# Patient Record
Sex: MALE | Race: WHITE | Employment: OTHER | URBAN - METROPOLITAN AREA
[De-identification: names, ages, dates, MRNs, and addresses within clinical notes are randomized per-mention and may not be internally consistent; named-entity substitution may affect disease eponyms.]

---

## 2022-08-04 ENCOUNTER — HOSPITAL ENCOUNTER (EMERGENCY)
Age: 62
Discharge: LEFT BEFORE DISCHARGE | End: 2022-08-04
Attending: EMERGENCY MEDICINE
Payer: MEDICARE

## 2022-08-04 ENCOUNTER — APPOINTMENT (OUTPATIENT)
Dept: GENERAL RADIOLOGY | Age: 62
End: 2022-08-04
Payer: MEDICARE

## 2022-08-04 VITALS
BODY MASS INDEX: 31.39 KG/M2 | HEART RATE: 93 BPM | DIASTOLIC BLOOD PRESSURE: 58 MMHG | RESPIRATION RATE: 17 BRPM | HEIGHT: 67 IN | SYSTOLIC BLOOD PRESSURE: 128 MMHG | OXYGEN SATURATION: 100 % | TEMPERATURE: 97.5 F | WEIGHT: 200 LBS

## 2022-08-04 DIAGNOSIS — R55 NEAR SYNCOPE: Primary | ICD-10-CM

## 2022-08-04 DIAGNOSIS — D64.9 ANEMIA, UNSPECIFIED TYPE: ICD-10-CM

## 2022-08-04 DIAGNOSIS — J18.1 MULTIFOCAL LUNG CONSOLIDATION (HCC): ICD-10-CM

## 2022-08-04 LAB
ANION GAP SERPL CALCULATED.3IONS-SCNC: 17 MEQ/L (ref 9–15)
BASOPHILS ABSOLUTE: 0.1 K/UL (ref 0–0.1)
BASOPHILS RELATIVE PERCENT: 0.4 % (ref 0.2–1.2)
BUN BLDV-MCNC: 26 MG/DL (ref 8–23)
CALCIUM SERPL-MCNC: 9 MG/DL (ref 8.5–9.9)
CHLORIDE BLD-SCNC: 101 MEQ/L (ref 95–107)
CO2: 20 MEQ/L (ref 20–31)
CREAT SERPL-MCNC: 1.25 MG/DL (ref 0.7–1.2)
EKG ATRIAL RATE: 80 BPM
EKG Q-T INTERVAL: 406 MS
EKG QRS DURATION: 94 MS
EKG QTC CALCULATION (BAZETT): 471 MS
EKG R AXIS: 52 DEGREES
EKG T AXIS: -2 DEGREES
EKG VENTRICULAR RATE: 81 BPM
EOSINOPHILS ABSOLUTE: 0.1 K/UL (ref 0–0.5)
EOSINOPHILS RELATIVE PERCENT: 0.9 % (ref 0.8–7)
GFR AFRICAN AMERICAN: >60
GFR NON-AFRICAN AMERICAN: 58.2
GLUCOSE BLD-MCNC: 136 MG/DL (ref 70–99)
HCT VFR BLD CALC: 26.4 % (ref 42–52)
HEMOGLOBIN: 7.7 G/DL (ref 13.7–17.5)
IMMATURE GRANULOCYTES #: 0.1 K/UL
IMMATURE GRANULOCYTES %: 0.6 %
LYMPHOCYTES ABSOLUTE: 2.5 K/UL (ref 1.3–3.6)
LYMPHOCYTES RELATIVE PERCENT: 19.5 %
MCH RBC QN AUTO: 22.4 PG (ref 25.7–32.2)
MCHC RBC AUTO-ENTMCNC: 29.2 % (ref 32.3–36.5)
MCV RBC AUTO: 76.7 FL (ref 79–92.2)
MONOCYTES ABSOLUTE: 0.8 K/UL (ref 0.3–0.8)
MONOCYTES RELATIVE PERCENT: 6.4 % (ref 5.3–12.2)
NEUTROPHILS ABSOLUTE: 9.2 K/UL (ref 1.8–5.4)
NEUTROPHILS RELATIVE PERCENT: 72.2 % (ref 34–67.9)
PDW BLD-RTO: 17.1 % (ref 11.6–14.4)
PLATELET # BLD: 594 K/UL (ref 163–337)
POTASSIUM SERPL-SCNC: 4.3 MEQ/L (ref 3.4–4.9)
RBC # BLD: 3.44 M/UL (ref 4.63–6.08)
SARS-COV-2, NAAT: NOT DETECTED
SODIUM BLD-SCNC: 138 MEQ/L (ref 135–144)
TROPONIN: <0.01 NG/ML (ref 0–0.01)
WBC # BLD: 12.7 K/UL (ref 4.2–9)

## 2022-08-04 PROCEDURE — 85025 COMPLETE CBC W/AUTO DIFF WBC: CPT

## 2022-08-04 PROCEDURE — 93010 ELECTROCARDIOGRAM REPORT: CPT | Performed by: INTERNAL MEDICINE

## 2022-08-04 PROCEDURE — 84484 ASSAY OF TROPONIN QUANT: CPT

## 2022-08-04 PROCEDURE — 2580000003 HC RX 258: Performed by: EMERGENCY MEDICINE

## 2022-08-04 PROCEDURE — 4500000002 HC ER NO CHARGE

## 2022-08-04 PROCEDURE — 87040 BLOOD CULTURE FOR BACTERIA: CPT

## 2022-08-04 PROCEDURE — 80048 BASIC METABOLIC PNL TOTAL CA: CPT

## 2022-08-04 PROCEDURE — 93005 ELECTROCARDIOGRAM TRACING: CPT

## 2022-08-04 PROCEDURE — 71045 X-RAY EXAM CHEST 1 VIEW: CPT

## 2022-08-04 PROCEDURE — 36415 COLL VENOUS BLD VENIPUNCTURE: CPT

## 2022-08-04 PROCEDURE — 96365 THER/PROPH/DIAG IV INF INIT: CPT

## 2022-08-04 PROCEDURE — 6360000002 HC RX W HCPCS: Performed by: EMERGENCY MEDICINE

## 2022-08-04 PROCEDURE — 87150 DNA/RNA AMPLIFIED PROBE: CPT

## 2022-08-04 PROCEDURE — 87635 SARS-COV-2 COVID-19 AMP PRB: CPT

## 2022-08-04 PROCEDURE — 6370000000 HC RX 637 (ALT 250 FOR IP): Performed by: EMERGENCY MEDICINE

## 2022-08-04 PROCEDURE — 86403 PARTICLE AGGLUT ANTBDY SCRN: CPT

## 2022-08-04 RX ORDER — ASPIRIN 81 MG/1
162 TABLET, CHEWABLE ORAL DAILY
COMMUNITY

## 2022-08-04 RX ORDER — SODIUM CHLORIDE 0.9 % (FLUSH) 0.9 %
3 SYRINGE (ML) INJECTION EVERY 8 HOURS
Status: DISCONTINUED | OUTPATIENT
Start: 2022-08-04 | End: 2022-08-04 | Stop reason: HOSPADM

## 2022-08-04 RX ORDER — AZITHROMYCIN 250 MG/1
500 TABLET, FILM COATED ORAL ONCE
Status: COMPLETED | OUTPATIENT
Start: 2022-08-04 | End: 2022-08-04

## 2022-08-04 RX ORDER — SIMVASTATIN 40 MG
40 TABLET ORAL NIGHTLY
COMMUNITY

## 2022-08-04 RX ORDER — HYDROCHLOROTHIAZIDE 12.5 MG/1
25 CAPSULE, GELATIN COATED ORAL DAILY
COMMUNITY

## 2022-08-04 RX ORDER — LISINOPRIL 20 MG/1
40 TABLET ORAL DAILY
COMMUNITY

## 2022-08-04 RX ORDER — 0.9 % SODIUM CHLORIDE 0.9 %
500 INTRAVENOUS SOLUTION INTRAVENOUS ONCE
Status: COMPLETED | OUTPATIENT
Start: 2022-08-04 | End: 2022-08-04

## 2022-08-04 RX ADMIN — CEFTRIAXONE 1000 MG: 1 INJECTION, POWDER, FOR SOLUTION INTRAMUSCULAR; INTRAVENOUS at 18:21

## 2022-08-04 RX ADMIN — Medication 3 ML: at 17:17

## 2022-08-04 RX ADMIN — SODIUM CHLORIDE 500 ML: 9 INJECTION, SOLUTION INTRAVENOUS at 17:00

## 2022-08-04 RX ADMIN — AZITHROMYCIN MONOHYDRATE 500 MG: 250 TABLET ORAL at 18:21

## 2022-08-04 ASSESSMENT — ENCOUNTER SYMPTOMS
SINUS PAIN: 0
EYE DISCHARGE: 0
COLOR CHANGE: 0
COUGH: 1
BACK PAIN: 0
DIARRHEA: 0
ABDOMINAL PAIN: 0
NAUSEA: 0
SORE THROAT: 0
VOMITING: 0
EYE REDNESS: 0
SHORTNESS OF BREATH: 0

## 2022-08-04 ASSESSMENT — PAIN - FUNCTIONAL ASSESSMENT
PAIN_FUNCTIONAL_ASSESSMENT: NONE - DENIES PAIN

## 2022-08-04 NOTE — ED NOTES
Patient verbalizes understanding risks of leaving against medical advice including the risk of worsening condition or even the possibility of death. Patient states he will follow up with his primary physician or return to hospital if condition worsens.       Andrea Braun RN  08/04/22 [id number]

## 2022-08-04 NOTE — LETTER
[facility] ED  [address]  Phone: [phone]    Patient: Pooja Morris  YOB: 1960  Date: 8/4/2022 Time: 7:42 PM    Leaving the Hospital Against Medical Advice    Chart #:[id number]    This will certify that I, the undersigned,    ______________________________________________________________________    A patient in the above named medical center, having requested discharge and removal from the medical center against the advice of my attending physician(s), hereby release the Emergency Department, its physicians, officers and employees, severally and individually, from any and all liability of any nature whatsoever for any injury or harm or complication of any kind that may result directly or indirectly, by reason of my terminating my stay as a patient from [facility], and hereby waive any and all rights of action I may now have or later acquire as a result of my voluntary departure from [facility] and the termination of my stay as a patient therein. This release is made with the full knowledge of the danger that may result from the action which I am taking.       Date:_______________________                         ___________________________                                                                                    Patient/Legal Representative    Witness:        ____________________________                          ___________________________  Nurse                                                                        Physician

## 2022-08-04 NOTE — LETTER
[facility]  [address]  Phone: [phone]    Patient: Pennie Araujo  YOB: 1960  Date: 8/4/2022 Time: 7:44 PM    Leaving the Hospital Against Medical Advice    Chart #:[id number]    This will certify that I, the undersigned,    ______________________________________________________________________    A patient in the above named medical center, having requested discharge and removal from the medical center against the advice of my attending physician(s), hereby release the Emergency Department, its physicians, officers and employees, severally and individually, from any and all liability of any nature whatsoever for any injury or harm or complication of any kind that may result directly or indirectly, by reason of my terminating my stay as a patient from [facility], and hereby waive any and all rights of action I may now have or later acquire as a result of my voluntary departure from [facility] and the termination of my stay as a patient therein. This release is made with the full knowledge of the danger that may result from the action which I am taking.       Date:_______________________                         ___________________________                                                                                    Patient/Legal Representative    Witness:        ____________________________                          ___________________________  Nurse                                                                        Physician

## 2022-08-04 NOTE — ED PROVIDER NOTES
Neurological:  Positive for weakness and light-headedness. Negative for numbness and headaches. Hematological:  Negative for adenopathy. Except as noted above the remainder of the review of systems was reviewed and negative. PAST MEDICAL HISTORY     Past Medical History:   Diagnosis Date    Cerebral artery occlusion with cerebral infarction ([facility] [address]     Hyperlipidemia     Hypertension          SURGICAL HISTORY     History reviewed. No pertinent surgical history. CURRENT MEDICATIONS       Discharge Medication List as of 8/6/2022 10:26 AM        CONTINUE these medications which have NOT CHANGED    Details   simvastatin (ZOCOR) 40 MG tablet Take 40 mg by mouth nightlyHistorical Med      lisinopril (PRINIVIL;ZESTRIL) 20 MG tablet Take 40 mg by mouth in the morning. Historical Med      hydroCHLOROthiazide (MICROZIDE) 12.5 MG capsule Take 25 mg by mouth in the morning. Historical Med      aspirin 81 MG chewable tablet Take 162 mg by mouth in the morning. Historical Med             ALLERGIES     Patient has no known allergies. FAMILY HISTORY     History reviewed. No pertinent family history.        SOCIAL HISTORY       Social History     Socioeconomic History    Marital status: Single     Spouse name: None    Number of children: None    Years of education: None    Highest education level: None   Tobacco Use    Smoking status: Never     Passive exposure: Never    Smokeless tobacco: Never   Substance and Sexual Activity    Alcohol use: Never    Drug use: Never           PHYSICAL EXAM    (up to 7 for level 4, 8 or more for level 5)     ED Triage Vitals [08/04/22 1606]   BP Temp Temp Source Heart Rate Resp SpO2 Height Weight   128/60 97.5 °F (36.4 °C) Oral 84 18 97 % 5' 7\" (1.702 m) 200 lb (90.7 kg)       Physical Exam  General appearance: Patient is awake alert interactive appropriate nontoxic in no acute distress, patient pale  Head is atraumatic normocephalic  Eyes pupils are equal and reactive sclera white conjunctive with mild pallor  Oral pharyngeal cavity again with mild pallor with good moisture, no exudates or ulcerations no asymmetry, the airway is widely patent  Neck: Supple no meningeal signs no adenopathy no JVD  Heart: Regular rate and rhythm no gross murmurs rubs or clicks  Lungs: Breath sounds are clear with good air movement throughout no active wheezes rales or rhonchi no respiratory distress  Abdomen: Soft nontender with good bowel sounds no rebound rigidity or guarding no firm or pulsatile masses, no gross distention, femoral pulses full and symmetric  Back: Nontender to palpation no costovertebral angle tenderness  Skin: Warm and dry without rashes  Lower extremities: No edema or calf tenderness or asymmetry. Neurologic: Patient is awake alert oriented pupils are equal and reactive extraocular muscles are intact facial symmetry is intact tongue is midline strength testing does reveal some chronic mild weakness of the left as compared to the right not new or different per patient patient is grossly intact in all 4 extremities.   Rectal examination was performed with finding of anemia: Stool was light brown Hemoccult negative on testing    DIAGNOSTIC RESULTS     EKG: All EKG's are interpreted by the Emergency Department Physician who either signs or Co-signs this chart in the absence of a cardiologist.    EKG interpreted by ED physician indication weakness near syncope: Normal sinus rhythm at 81 with no significant ST-T change no acute infarction pattern    RADIOLOGY:   Non-plain film images such as CT, Ultrasound and MRI are read by the radiologist. Plain radiographic images are visualized and preliminarily interpreted by the emergency physician with the below findings:    X-ray 1 view interpreted by ED physician indication near syncope: Heart and mediastinum appear grossly normal where visualized although left heart margins are lost with increased consolidation throughout the left lung field nearly completely opacified with nodular round appearing densities in the apical region. Right lower lung field with increased patchy consolidation in the right base. Interpretation per the Radiologist below, if available at the time of this note:    XR CHEST PORTABLE   Final Result      Nonspecific near complete opacification of the left hemithorax. Overall this finding is nonspecific and may relate to large pleural effusion, infectious/inflammatory process, obstructing mass, etc. Consider correlation with dedicated CT of the chest or    short interval follow-up chest radiograph. Patchy opacity at the right lung base, with differential including infectious/inflammatory etiologies and atelectasis/scarring, etc. This can also be assessed on the follow-up imaging to ensure resolution.           LABS:  Labs Reviewed   CULTURE, BLOOD 1 - Abnormal; Notable for the following components:       Result Value    Blood Culture, Routine   (*)     Value: Gram stain aerobic bottle  Gram positive cocci in clusters-resembling Staph  1 out of 2 blood cultures  Further results to follow  Further testing performed at [place] [address]      All other components within normal limits    Narrative:     ORDER#: [id number]                          ORDERED BY: Mukul Pradhan  SOURCE: Blood                              COLLECTED:  08/04/22 18:24  ANTIBIOTICS AT [facility].:                      RECEIVED :  08/04/22 21:07   CULTURE, BLOOD ID SENSITIVITY - Abnormal; Notable for the following components:    Culture, Blood Id Sensitivity   (*)     Value: Cult,Blood:  POSITIVE Blood Culture  Performed at [address] [place] [address]  [phone]      Organism Staphylococcus coagulase-negative (*)     All other components within normal limits    Narrative:     ORDER#: [id number]                          ORDERED BY: Mukul Pradhan  SOURCE: Blood                              COLLECTED:  08/04/22 18:24  ANTIBIOTICS AT [facility].: RECEIVED :  08/04/22 65:11   BASIC METABOLIC PANEL - Abnormal; Notable for the following components:    Anion Gap 17 (*)     Glucose 136 (*)     BUN 26 (*)     Creatinine 1.25 (*)     GFR Non- 58.2 (*)     All other components within normal limits   CBC WITH AUTO DIFFERENTIAL - Abnormal; Notable for the following components:    WBC 12.7 (*)     RBC 3.44 (*)     Hemoglobin 7.7 (*)     Hematocrit 26.4 (*)     MCV 76.7 (*)     MCH 22.4 (*)     MCHC 29.2 (*)     RDW 17.1 (*)     Platelets 888 (*)     Neutrophils % 72.2 (*)     Neutrophils Absolute 9.2 (*)     All other components within normal limits   BLOOD ID PANEL, MOLECULAR - Abnormal; Notable for the following components:    Staphylococcus species by PCR DETECTED (*)     All other components within normal limits   CULTURE, BLOOD 2    Narrative:     ORDER#: [id number]                          ORDERED BY: Marsha Ring  SOURCE: Blood                              COLLECTED:  08/04/22 18:24  ANTIBIOTICS AT [facility].:                      RECEIVED :  08/04/22 21:07   COVID-19, RAPID   TROPONIN   Laboratory review: CBC reveals leukocytosis of 12.7, significant anemia of 7.7 thrombocytosis with a platelet count of 261, BMP reveals mild renal insufficiency with a BUN/creatinine of 26 and 1.25, troponin is within normal limits  Last hemoglobin I could find was from 2018 at which time it was 14.2    All other labs were within normal range or not returned as of this dictation. EMERGENCY DEPARTMENT COURSE and DIFFERENTIAL DIAGNOSIS/MDM:   Vitals:    Vitals:    08/04/22 1606 08/04/22 1718 08/04/22 1724 08/04/22 1933   BP: 128/60   (!) 128/58   Pulse: 84  88 93   Resp: 18 16 16 17   Temp: 97.5 °F (36.4 °C)      TempSrc: Oral      SpO2: 97% 99% 96% 100%   Weight: 200 lb (90.7 kg)      Height: 5' 7\" (1.702 m)        Orthostatic Vitals:  Orthostatic Vitals 8/4/2022   Orthostatic B/P and Pulse?  Yes   Blood Pressure Lying 133/56   Pulse Lying 83 mis-transcribed.)    Aarti Jackson DO (electronically signed)  Attending Emergency Physician            Aarti Jackson DO  08/08/22 [id number]

## 2022-08-04 NOTE — ED TRIAGE NOTES
Pt presents to ED with weakness about one hour PTA. Pt states he was seated, waiting for order when he was overcome with weakness and a cold sweat and fell. Denies hitting head.

## 2022-08-05 LAB
ACINETOBACTER CALCOAC BAUMANNII COMPLEX BY PCR: NOT DETECTED
BACTEROIDES FRAGILIS BY PCR: NOT DETECTED
CANDIDA ALBICANS BY PCR: NOT DETECTED
CANDIDA AURIS BY PCR: NOT DETECTED
CANDIDA GLABRATA BY PCR: NOT DETECTED
CANDIDA KRUSEI BY PCR: NOT DETECTED
CANDIDA PARAPSILOSIS BY PCR: NOT DETECTED
CANDIDA TROPICALIS BY PCR: NOT DETECTED
CRYPTOCOCCUS NEOFORMANS/GATTII BY PCR: NOT DETECTED
ENTEROBACTER CLOACAE COMPLEX BY PCR: NOT DETECTED
ENTEROBACTERALES BY PCR: NOT DETECTED
ENTEROCOCCUS FAECALIS BY PCR: NOT DETECTED
ENTEROCOCCUS FAECIUM BY PCR: NOT DETECTED
ESCHERICHIA COLI BY PCR: NOT DETECTED
HAEMOPHILUS INFLUENZAE BY PCR: NOT DETECTED
KLEBSIELLA AEROGENES BY PCR: NOT DETECTED
KLEBSIELLA OXYTOCA BY PCR: NOT DETECTED
KLEBSIELLA PNEUMONIAE GROUP BY PCR: NOT DETECTED
LISTERIA MONOCYTOGENES BY PCR: NOT DETECTED
NEISSERIA MENINGITIDIS BY PCR: NOT DETECTED
PROTEUS SPECIES BY PCR: NOT DETECTED
PSEUDOMONAS AERUGINOSA BY PCR: NOT DETECTED
SALMONELLA SPECIES BY PCR: NOT DETECTED
SERRATIA MARCESCENS BY PCR: NOT DETECTED
STAPHYLOCOCCUS AUREUS BY PCR: NOT DETECTED
STAPHYLOCOCCUS EPIDERMIDIS BY PCR: NOT DETECTED
STAPHYLOCOCCUS LUGDUNENSIS BY PCR: NOT DETECTED
STAPHYLOCOCCUS SPECIES BY PCR: DETECTED
STENOTROPHOMONAS MALTOPHILIA BY PCR: NOT DETECTED
STREPTOCOCCUS AGALACTIAE BY PCR: NOT DETECTED
STREPTOCOCCUS PNEUMONIAE BY PCR: NOT DETECTED
STREPTOCOCCUS PYOGENES  BY PCR: NOT DETECTED
STREPTOCOCCUS SPECIES BY PCR: NOT DETECTED

## 2022-08-06 LAB — BLOOD CULTURE, ROUTINE: ABNORMAL

## 2022-08-08 LAB
CULTURE, BLOOD ID SENSITIVITY: ABNORMAL
CULTURE, BLOOD ID SENSITIVITY: ABNORMAL
ORGANISM: ABNORMAL

## 2022-08-09 LAB — CULTURE, BLOOD 2: NORMAL
